# Patient Record
Sex: MALE | Race: WHITE | NOT HISPANIC OR LATINO | Employment: FULL TIME | URBAN - METROPOLITAN AREA
[De-identification: names, ages, dates, MRNs, and addresses within clinical notes are randomized per-mention and may not be internally consistent; named-entity substitution may affect disease eponyms.]

---

## 2022-04-19 ENCOUNTER — NON-PROVIDER VISIT (OUTPATIENT)
Dept: URGENT CARE | Facility: PHYSICIAN GROUP | Age: 22
End: 2022-04-19

## 2022-04-19 DIAGNOSIS — Z02.1 PRE-EMPLOYMENT DRUG SCREENING: Primary | ICD-10-CM

## 2022-04-19 LAB
AMP AMPHETAMINE: NORMAL
BAR BARBITURATES: NORMAL
BZO BENZODIAZEPINES: NORMAL
COC COCAINE: NORMAL
INT CON NEG: NORMAL
INT CON POS: NORMAL
MDMA ECSTASY: NORMAL
MET METHAMPHETAMINES: NORMAL
MTD METHADONE: NORMAL
OPI OPIATES: NORMAL
OXY OXYCODONE: NORMAL
PCP PHENCYCLIDINE: NORMAL
POC URINE DRUG SCREEN OCDRS: NORMAL
THC: NORMAL

## 2022-04-19 PROCEDURE — 80305 DRUG TEST PRSMV DIR OPT OBS: CPT | Performed by: PHYSICIAN ASSISTANT

## 2022-04-28 ENCOUNTER — NON-PROVIDER VISIT (OUTPATIENT)
Dept: OCCUPATIONAL MEDICINE | Facility: CLINIC | Age: 22
End: 2022-04-28

## 2022-04-28 DIAGNOSIS — Z02.89 VISIT FOR OCCUPATIONAL HEALTH EXAMINATION: ICD-10-CM

## 2022-04-28 PROCEDURE — 8911 PR MRO FEE: Performed by: PREVENTIVE MEDICINE

## 2023-04-26 ENCOUNTER — OFFICE VISIT (OUTPATIENT)
Dept: URGENT CARE | Facility: PHYSICIAN GROUP | Age: 23
End: 2023-04-26
Payer: COMMERCIAL

## 2023-04-26 VITALS
HEART RATE: 87 BPM | WEIGHT: 160 LBS | OXYGEN SATURATION: 97 % | RESPIRATION RATE: 20 BRPM | TEMPERATURE: 98.2 F | HEIGHT: 74 IN | SYSTOLIC BLOOD PRESSURE: 100 MMHG | DIASTOLIC BLOOD PRESSURE: 66 MMHG | BODY MASS INDEX: 20.53 KG/M2

## 2023-04-26 DIAGNOSIS — R11.2 NAUSEA AND VOMITING, UNSPECIFIED VOMITING TYPE: ICD-10-CM

## 2023-04-26 DIAGNOSIS — K52.9 GASTROENTERITIS: ICD-10-CM

## 2023-04-26 PROCEDURE — 99213 OFFICE O/P EST LOW 20 MIN: CPT | Performed by: PHYSICIAN ASSISTANT

## 2023-04-26 RX ORDER — ALBUTEROL SULFATE 90 UG/1
2 AEROSOL, METERED RESPIRATORY (INHALATION)
COMMUNITY

## 2023-04-26 RX ORDER — ONDANSETRON 4 MG/1
4 TABLET, ORALLY DISINTEGRATING ORAL EVERY 6 HOURS PRN
Qty: 15 TABLET | Refills: 0 | Status: SHIPPED | OUTPATIENT
Start: 2023-04-26

## 2023-04-26 ASSESSMENT — ENCOUNTER SYMPTOMS
FATIGUE: 1
VOMITING: 1
SORE THROAT: 0
COUGH: 0
ABDOMINAL PAIN: 0
NAUSEA: 1
NUMBER OF EPISODES OF EMESIS TODAY: 1
FEVER: 0

## 2023-04-26 NOTE — PROGRESS NOTES
Subjective:   Mckay Sanders is a 22 y.o. male who presents for Emesis (Nausea,fever,diarrhea,x3 days/Pt req note for work.)         Patient presents with onset of emesis Mon AM, nonbloody, nonbilious.   Has persisted over the last 2 days however slowly improving.  Assoc. Nausea.  10 episodes of vomiting total.  He denies focal abdominal pain, generalized discomfort from retching.  Assoc. Diarrhea; none today.  No bloody stools.  No known fevers.  Vomiting improving.  Drinking water.  Still voiding.  Burger from [employer] the night before, no obvious suspect food intake.  No recent abx use or travel.  H/o appendectomy.      Emesis  Associated symptoms include fatigue, nausea and vomiting. Pertinent negatives include no abdominal pain, coughing, fever or sore throat. The symptoms are aggravated by eating.       Review of Systems   Constitutional:  Positive for fatigue. Negative for fever.   HENT:  Negative for sore throat.    Respiratory:  Negative for cough.    Gastrointestinal:  Positive for nausea and vomiting. Negative for abdominal pain.     Medications:  This patient does not have an active medication from one of the medication groupers.    Allergies:             Penicillins    Surgical History:       No past surgical history on file.    Past Social Hx:  Mckay Sanders  reports that he has been smoking. He has never used smokeless tobacco. He reports that he does not drink alcohol and does not use drugs.     Past Family Hx:   Mckay Sanders family history includes Alcohol/Drug in his maternal grandfather; Diabetes in his father, paternal grandfather, and paternal grandmother; Heart Disease in his father and paternal grandfather; Hyperlipidemia in his paternal grandmother; Hypertension in his father and paternal grandmother; Other in his paternal grandfather; Psychiatric Illness in his maternal grandmother.       Problem list, medications, and allergies reviewed by myself today in Epic.     Objective:     /66 (BP  "Location: Right arm, Patient Position: Sitting, BP Cuff Size: Adult)   Pulse 87   Temp 36.8 °C (98.2 °F) (Temporal)   Resp 20   Ht 1.88 m (6' 2\")   Wt 72.6 kg (160 lb)   SpO2 97%   BMI 20.54 kg/m²     Physical Exam  Vitals and nursing note reviewed.   Constitutional:       General: He is not in acute distress.     Appearance: Normal appearance. He is not ill-appearing, toxic-appearing or diaphoretic.   HENT:      Nose: Nose normal.      Mouth/Throat:      Mouth: Mucous membranes are moist.      Pharynx: No oropharyngeal exudate or posterior oropharyngeal erythema.   Eyes:      Extraocular Movements: Extraocular movements intact.      Pupils: Pupils are equal, round, and reactive to light.   Cardiovascular:      Rate and Rhythm: Normal rate and regular rhythm.      Pulses: Normal pulses.      Heart sounds: Normal heart sounds.   Pulmonary:      Effort: Pulmonary effort is normal. No tachypnea, accessory muscle usage, prolonged expiration, respiratory distress or retractions.      Breath sounds: Normal breath sounds and air entry. No stridor or decreased air movement. No decreased breath sounds, wheezing, rhonchi or rales.      Comments: Lungs cta b/l  Abdominal:      General: Abdomen is flat. Bowel sounds are normal. There is no distension. There are no signs of injury.      Palpations: Abdomen is soft. There is no splenomegaly, mass or pulsatile mass.      Tenderness: There is generalized abdominal tenderness. There is no right CVA tenderness, left CVA tenderness, guarding or rebound. Negative signs include Swenson's sign, Rovsing's sign and McBurney's sign.      Hernia: No hernia is present.      Comments: Generalized abdominal pain, nonfocal.  No guarding or rebound.  Bowel sounds present in all quadrants.  Negative McBurney's.  Negative Swenson's.   Musculoskeletal:      Cervical back: No rigidity.   Lymphadenopathy:      Cervical: No cervical adenopathy.   Neurological:      Mental Status: He is alert. " "      Assessment/Plan:     Diagnosis and Associated Orders:     1. Nausea and vomiting, unspecified vomiting type  - ondansetron (ZOFRAN ODT) 4 MG TABLET DISPERSIBLE; Take 1 Tablet by mouth every 6 hours as needed for Nausea/Vomiting for up to 15 doses.  Dispense: 15 Tablet; Refill: 0    2. Gastroenteritis    Other orders  - albuterol 108 (90 Base) MCG/ACT Aero Soln inhalation aerosol; Inhale 2 Puffs.        Comments/MDM:    Pt has been vomiting for 3 days. Abdomen is soft, non-distended and no obvious signs of pain on palpation. Decreased appetite. Denies bloody or bilious emesis. Denies hematochezia or melena. Denies focal abdominal pain.  No red flags on abdominal exam.  Does not appear clinically dehydrated.  Vital signs stable and reassuring.    Gastroenteritis   Gastroenteritis is an illness of the intestines. It is sometimes called \"stomach flu\". It causes nausea, vomiting, stomach cramps, watery poop (diarrhea) and a slight fever. It is usually caused by a virus. This illness often clears up in 4-5 days. However, it can be serious when people who lose too much fluid from throwing up (vomiting) and/or diarrhea. When too much fluid is lost and has not been replaced, it is called dehydration.   HOME CARE   Wash your hands a lot.   Rest.   Drink fluids slowly. Drinking too much or too fast can cause you to throw up.   Drink \"oral rehydration fluids\" (ORS) as directed. They can be bought in a grocery store or pharmacy. Ask your doctor or pharmacist how to take the ORS if you do not understand.   Do not eat too much at once.   Avoid tobacco, alcohol and drugs that upset your stomach.   BRAT diet start eating bananas, rice, apples and dry toast   GET HELP RIGHT AWAY IF:   You become weak, dizzy, or faint.   You cannot keep fluids down.   You have a dry mouth, no tears and pee less. These are signs of dehydration.   Belly (abdominal) pain starts, feels worse, or stays in one place.   You or your child has a fever " above 102º F (38.9º C) for more than 1 day.   Diarrhea has blood or mucous in it.   You become confused.   After 5-7 days, you are still throwing up and/or having diarrhea.   MAKE SURE YOU:   Understand these instructions.   Will watch your condition.   Will get help right away if you are not doing well or get worse.      I personally reviewed prior external notes and test results pertinent to today's visit.  Red flags discussed as well as indications to present to the Emergency Department.  Supportive care, natural history, differential diagnoses, and indications for immediate follow-up discussed.  Patient expresses understanding and agrees to plan.  Patient denies any other questions or concerns.    Follow-up with the primary care physician for recheck, reevaluation, and consideration of further management.      Please note that this dictation was created using voice recognition software. I have made a reasonable attempt to correct obvious errors, but I expect that there are errors of grammar and possibly content that I did not discover before finalizing the note.    This note was electronically signed by Xi Tejeda PA-C

## 2023-04-26 NOTE — LETTER
April 26, 2023    To Whom It May Concern:         This is confirmation that Mckay Sanders attended his scheduled appointment with Xi Tejeda P.A.-C. on 4/26/23.  Please excuse patient from work 4/24-4/27.         If you have any questions please do not hesitate to call me at the phone number listed below.    Sincerely,          Xi Tejeda P.A.-C.  [phone]

## 2023-08-11 ENCOUNTER — OFFICE VISIT (OUTPATIENT)
Dept: URGENT CARE | Facility: PHYSICIAN GROUP | Age: 23
End: 2023-08-11
Payer: COMMERCIAL

## 2023-08-11 VITALS
SYSTOLIC BLOOD PRESSURE: 116 MMHG | WEIGHT: 160 LBS | TEMPERATURE: 97.9 F | HEIGHT: 74 IN | OXYGEN SATURATION: 97 % | RESPIRATION RATE: 16 BRPM | BODY MASS INDEX: 20.53 KG/M2 | DIASTOLIC BLOOD PRESSURE: 84 MMHG | HEART RATE: 69 BPM

## 2023-08-11 DIAGNOSIS — J02.9 SORE THROAT: ICD-10-CM

## 2023-08-11 DIAGNOSIS — B08.5 HERPANGINA: ICD-10-CM

## 2023-08-11 DIAGNOSIS — B00.1 COLD SORE: ICD-10-CM

## 2023-08-11 LAB — S PYO DNA SPEC NAA+PROBE: NOT DETECTED

## 2023-08-11 PROCEDURE — 3079F DIAST BP 80-89 MM HG: CPT | Performed by: NURSE PRACTITIONER

## 2023-08-11 PROCEDURE — 99213 OFFICE O/P EST LOW 20 MIN: CPT | Performed by: NURSE PRACTITIONER

## 2023-08-11 PROCEDURE — 87651 STREP A DNA AMP PROBE: CPT | Performed by: NURSE PRACTITIONER

## 2023-08-11 PROCEDURE — 3074F SYST BP LT 130 MM HG: CPT | Performed by: NURSE PRACTITIONER

## 2023-08-11 RX ORDER — LIDOCAINE HYDROCHLORIDE 20 MG/ML
SOLUTION OROPHARYNGEAL
Qty: 100 ML | Refills: 0 | Status: SHIPPED | OUTPATIENT
Start: 2023-08-11

## 2023-08-11 RX ORDER — VALACYCLOVIR HYDROCHLORIDE 1 G/1
1000 TABLET, FILM COATED ORAL 2 TIMES DAILY
Qty: 14 TABLET | Refills: 0 | Status: SHIPPED | OUTPATIENT
Start: 2023-08-11 | End: 2023-08-18

## 2023-08-11 ASSESSMENT — ENCOUNTER SYMPTOMS
CHILLS: 0
NEUROLOGICAL NEGATIVE: 1
CONSTITUTIONAL NEGATIVE: 1
TROUBLE SWALLOWING: 1
EYES NEGATIVE: 1
CARDIOVASCULAR NEGATIVE: 1
FEVER: 0
SWOLLEN GLANDS: 1
RESPIRATORY NEGATIVE: 1
SORE THROAT: 1
MUSCULOSKELETAL NEGATIVE: 1
GASTROINTESTINAL NEGATIVE: 1

## 2023-08-11 NOTE — LETTER
August 11, 2023       Patient: Mckay Sanders   YOB: 2000   Date of Visit: 8/11/2023         To Whom It May Concern:    In my medical opinion, I recommend that Mckay Sanders be excused from work 8/10/2023 and 8/11/2023 due to illness.     If you have any questions or concerns, please don't hesitate to call [phone]          Sincerely,          MIRIAM Nuñez.P.R.N.  Electronically Signed

## 2023-08-11 NOTE — PROGRESS NOTES
"Subjective:   Mckay Sanders is a 23 y.o. male who presents for Allergic Reaction (Wednesday tongue swelled, blisters in mouth, throat is sore, not getting any better )      Pharyngitis   This is a new problem. Episode onset: 2 days. The problem has been gradually worsening. The pain is worse on the right side. There has been no fever. The pain is at a severity of 6/10. The pain is moderate. Associated symptoms include swollen glands and trouble swallowing. He has tried NSAIDs, cool liquids and gargles for the symptoms. The treatment provided mild relief.       Review of Systems   Constitutional: Negative.  Negative for chills and fever.   HENT:  Positive for sore throat and trouble swallowing.         Sores in mouth, sore on lip   Eyes: Negative.    Respiratory: Negative.     Cardiovascular: Negative.    Gastrointestinal: Negative.    Genitourinary: Negative.    Musculoskeletal: Negative.    Skin: Negative.    Neurological: Negative.        Medications, Allergies, and current problem list reviewed today in Epic.     Objective:     /84 (BP Location: Right arm, Patient Position: Sitting, BP Cuff Size: Adult)   Pulse 69   Temp 36.6 °C (97.9 °F) (Temporal)   Resp 16   Ht 1.88 m (6' 2\")   Wt 72.6 kg (160 lb)   SpO2 97%     Physical Exam  Vitals reviewed.   Constitutional:       General: He is not in acute distress.     Appearance: Normal appearance. He is not ill-appearing.   HENT:      Head: Normocephalic and atraumatic.      Right Ear: Tympanic membrane, ear canal and external ear normal.      Left Ear: Tympanic membrane, ear canal and external ear normal.      Nose: Nose normal.      Mouth/Throat:      Lips: Lesions present.      Mouth: Mucous membranes are moist. Oral lesions present. No angioedema.      Tongue: Lesions present.      Pharynx: Oropharynx is clear. Uvula midline. Posterior oropharyngeal erythema present. No oropharyngeal exudate.      Tonsils: No tonsillar exudate. 1+ on the right. 1+ on the " left.        Comments: Oral ulcers to right tongue, right buccal mucosa and oropharynx.  Cold sore noted to left bottom lip.   Eyes:      Extraocular Movements: Extraocular movements intact.      Conjunctiva/sclera: Conjunctivae normal.      Pupils: Pupils are equal, round, and reactive to light.   Cardiovascular:      Rate and Rhythm: Normal rate and regular rhythm.      Pulses: Normal pulses.      Heart sounds: Normal heart sounds.   Pulmonary:      Effort: Pulmonary effort is normal.      Breath sounds: Normal breath sounds.   Abdominal:      General: Abdomen is flat. Bowel sounds are normal.      Palpations: Abdomen is soft.   Musculoskeletal:         General: Normal range of motion.      Cervical back: Normal range of motion and neck supple.   Skin:     General: Skin is warm and dry.      Capillary Refill: Capillary refill takes less than 2 seconds.   Neurological:      General: No focal deficit present.      Mental Status: He is alert and oriented to person, place, and time.   Psychiatric:         Mood and Affect: Mood normal.         Behavior: Behavior normal.         Lab Results/POC Test Results   Results for orders placed or performed in visit on 08/11/23   POCT GROUP A STREP, PCR   Result Value Ref Range    POC Group A Strep, PCR Not Detected Not Detected, Invalid           Assessment/Plan:     Diagnosis and associated orders:     1. Herpangina        2. Sore throat  POCT GROUP A STREP, PCR    lidocaine (XYLOCAINE) 2 % Solution      3. Cold sore  valacyclovir (VALTREX) 1 GM Tab         Comments/MDM:     Patient will be treated with viscous lidocaine for his oral ulcerations, and valacyclovir for his cold sore. He will follow up with PCP in 7 to 10 days, and present back to UC if worsening symptoms.          Differential diagnosis, natural history, supportive care, and indications for immediate follow-up discussed.    Advised the patient to follow-up with the primary care physician for recheck,  reevaluation, and consideration of further management.    Please note that this dictation was created using voice recognition software. I have made a reasonable attempt to correct obvious errors, but I expect that there are errors of grammar and possibly content that I did not discover before finalizing the note.    This note was electronically signed by JOHN Palumbo

## 2024-09-25 ENCOUNTER — OFFICE VISIT (OUTPATIENT)
Dept: URGENT CARE | Facility: PHYSICIAN GROUP | Age: 24
End: 2024-09-25
Payer: COMMERCIAL

## 2024-09-25 VITALS
HEART RATE: 87 BPM | BODY MASS INDEX: 23.87 KG/M2 | WEIGHT: 186 LBS | TEMPERATURE: 98.3 F | RESPIRATION RATE: 16 BRPM | OXYGEN SATURATION: 96 % | DIASTOLIC BLOOD PRESSURE: 72 MMHG | SYSTOLIC BLOOD PRESSURE: 120 MMHG | HEIGHT: 74 IN

## 2024-09-25 DIAGNOSIS — J06.9 VIRAL URI WITH COUGH: ICD-10-CM

## 2024-09-25 DIAGNOSIS — Z76.0 MEDICATION REFILL: ICD-10-CM

## 2024-09-25 DIAGNOSIS — J02.9 PHARYNGITIS, UNSPECIFIED ETIOLOGY: ICD-10-CM

## 2024-09-25 LAB
FLUAV RNA SPEC QL NAA+PROBE: NEGATIVE
FLUBV RNA SPEC QL NAA+PROBE: NEGATIVE
RSV RNA SPEC QL NAA+PROBE: NEGATIVE
S PYO DNA SPEC NAA+PROBE: NOT DETECTED
SARS-COV-2 RNA RESP QL NAA+PROBE: NEGATIVE

## 2024-09-25 PROCEDURE — 3074F SYST BP LT 130 MM HG: CPT

## 2024-09-25 PROCEDURE — 99213 OFFICE O/P EST LOW 20 MIN: CPT

## 2024-09-25 PROCEDURE — 0241U POCT CEPHEID COV-2, FLU A/B, RSV - PCR: CPT

## 2024-09-25 PROCEDURE — 87651 STREP A DNA AMP PROBE: CPT

## 2024-09-25 PROCEDURE — 3078F DIAST BP <80 MM HG: CPT

## 2024-09-25 RX ORDER — BENZONATATE 100 MG/1
100 CAPSULE ORAL 3 TIMES DAILY PRN
Qty: 60 CAPSULE | Refills: 0 | Status: SHIPPED | OUTPATIENT
Start: 2024-09-25

## 2024-09-25 RX ORDER — ALBUTEROL SULFATE 90 UG/1
2 INHALANT RESPIRATORY (INHALATION) EVERY 6 HOURS PRN
Qty: 8.5 G | Refills: 0 | Status: SHIPPED | OUTPATIENT
Start: 2024-09-25

## 2024-09-25 ASSESSMENT — ENCOUNTER SYMPTOMS
MYALGIAS: 1
FEVER: 1
COUGH: 1
SORE THROAT: 1

## 2024-09-25 NOTE — LETTER
September 25, 2024    To Whom It May Concern:         This is confirmation that Mckay Sanders attended his scheduled appointment with Lupe Robert P.A.-C. on 9/25/24. Please excuse his absences from work 9/23/24 through 9/25/24.          If you have any questions please do not hesitate to call me at the phone number listed below.    Sincerely,          Lupe Robert P.A.-C.  [phone]

## 2024-09-25 NOTE — PROGRESS NOTES
Subjective:     CHIEF COMPLAINT  Chief Complaint   Patient presents with    Cough     Coughing and cold sweats, hard time sleeping. 3 days.        HPI  Mckay Sanders is a very pleasant 24 y.o. male who presents with a cough, body aches, sore throat, fatigue, and sweats for the past 3 days.  His coworker was sick with similar symptoms.  He has felt feverish but has not measured his temperature.  He has been managing his symptoms with naproxen as needed.  He reports a history of asthma and has ran out of his albuterol inhaler.    REVIEW OF SYSTEMS  Review of Systems   Constitutional:  Positive for fever (Tactile) and malaise/fatigue.   HENT:  Positive for congestion, ear pain (Pressure) and sore throat.    Respiratory:  Positive for cough.    Musculoskeletal:  Positive for myalgias.       PAST MEDICAL HISTORY  There are no problems to display for this patient.      SURGICAL HISTORY  patient denies any surgical history    ALLERGIES  Allergies   Allergen Reactions    Penicillins Unspecified     Family Hx       CURRENT MEDICATIONS  Home Medications       Reviewed by Lupe Robert P.A.-C. (Physician Assistant) on 09/25/24 at 1434  Med List Status: <None>     Medication Last Dose Status   albuterol 108 (90 Base) MCG/ACT Aero Soln inhalation aerosol Not Taking Active   lidocaine (XYLOCAINE) 2 % Solution Not Taking Active   ondansetron (ZOFRAN ODT) 4 MG TABLET DISPERSIBLE Not Taking Active                    SOCIAL HISTORY  Social History     Tobacco Use    Smoking status: Every Day    Smokeless tobacco: Never   Substance and Sexual Activity    Alcohol use: No    Drug use: No    Sexual activity: Never       FAMILY HISTORY  Family History   Problem Relation Age of Onset    Diabetes Father     Heart Disease Father         CHF    Hypertension Father     Psychiatric Illness Maternal Grandmother     Alcohol/Drug Maternal Grandfather     Hypertension Paternal Grandmother     Diabetes Paternal Grandmother     Hyperlipidemia  "Paternal Grandmother     Heart Disease Paternal Grandfather     Other Paternal Grandfather         Polio    Diabetes Paternal Grandfather           Objective:     VITAL SIGNS: /72   Pulse 87   Temp 36.8 °C (98.3 °F) (Temporal)   Resp 16   Ht 1.88 m (6' 2\")   Wt 84.4 kg (186 lb)   SpO2 96%   BMI 23.88 kg/m²     PHYSICAL EXAM  Physical Exam  Vitals reviewed.   Constitutional:       General: He is not in acute distress.     Appearance: Normal appearance. He is ill-appearing. He is not toxic-appearing.   HENT:      Head: Normocephalic and atraumatic.      Right Ear: Tympanic membrane, ear canal and external ear normal.      Left Ear: Tympanic membrane, ear canal and external ear normal.      Nose: Nose normal.      Mouth/Throat:      Mouth: Mucous membranes are moist.      Pharynx: Posterior oropharyngeal erythema present. No oropharyngeal exudate.      Comments: Uvula midline.  Tonsils 2+ bilaterally.  Eyes:      Conjunctiva/sclera: Conjunctivae normal.      Pupils: Pupils are equal, round, and reactive to light.   Cardiovascular:      Rate and Rhythm: Normal rate and regular rhythm.      Heart sounds: Normal heart sounds.   Pulmonary:      Effort: Pulmonary effort is normal. No respiratory distress.      Breath sounds: No stridor. Wheezing (Wheezing in bilateral lower lung fields) present. No rhonchi or rales.   Skin:     General: Skin is warm and dry.      Coloration: Skin is not pale.      Findings: No rash.   Neurological:      General: No focal deficit present.      Mental Status: He is alert and oriented to person, place, and time.   Psychiatric:         Mood and Affect: Mood normal.         Assessment/Plan:     1. Viral URI with cough  - POCT CoV-2, Flu A/B, RSV by PCR  - benzonatate (TESSALON) 100 MG Cap; Take 1 Capsule by mouth 3 times a day as needed for Cough.  Dispense: 60 Capsule; Refill: 0    2. Pharyngitis, unspecified etiology  - POCT CEPHEID GROUP A STREP - PCR    3. Medication refill  - " albuterol 108 (90 Base) MCG/ACT Aero Soln inhalation aerosol; Inhale 2 Puffs every 6 hours as needed for Shortness of Breath.  Dispense: 8.5 g; Refill: 0  -Warm salt water gargles as needed for sore throat  -Tylenol/ibuprofen OTC as needed for pain  -Return to clinic if symptoms worsen or fail to resolve      MDM/Comments:  Patient has stable vital signs and is non-toxic appearing.  Strep and viral testing for COVID, influenza, and RSV performed in office with negative results.  Patient provided benzonatate for cough relief.  Patient provided a refill of his albuterol inhaler to use as needed.  Discussed supportive care measures with warm salt water gargles, rest, tylenol/ibuprofen OTC as needed for pain, and maintaining adequate hydration. Patient demonstrated understanding of treatment plan and agreed to return to the clinic if symptoms worsen or fail to resolve.     Differential diagnosis, natural history, supportive care, and indications for immediate follow-up discussed. All questions answered. Patient agrees with the plan of care.    Follow-up as needed if symptoms worsen or fail to improve to PCP, Urgent care or Emergency Room.    I have personally reviewed prior external notes and test results pertinent to today's visit.  I have independently reviewed and interpreted all diagnostics ordered during this urgent care acute visit.   Discussed management options (risks,benefits, and alternatives to treatment). Pt expresses understanding and the treatment plan was agreed upon. Questions were encouraged and answered to pt's satisfaction.    Please note that this dictation was created using voice recognition software. I have made a reasonable attempt to correct obvious errors, but I expect that there are errors of grammar and possibly content that I did not discover before finalizing the note.

## 2024-10-03 ENCOUNTER — OFFICE VISIT (OUTPATIENT)
Dept: URGENT CARE | Facility: PHYSICIAN GROUP | Age: 24
End: 2024-10-03
Payer: COMMERCIAL

## 2024-10-03 VITALS
SYSTOLIC BLOOD PRESSURE: 120 MMHG | WEIGHT: 187 LBS | OXYGEN SATURATION: 96 % | HEART RATE: 86 BPM | DIASTOLIC BLOOD PRESSURE: 84 MMHG | RESPIRATION RATE: 16 BRPM | TEMPERATURE: 99.1 F | BODY MASS INDEX: 24.01 KG/M2

## 2024-10-03 DIAGNOSIS — J40 BRONCHITIS: ICD-10-CM

## 2024-10-03 DIAGNOSIS — B96.89 ACUTE BACTERIAL SINUSITIS: ICD-10-CM

## 2024-10-03 DIAGNOSIS — J01.90 ACUTE BACTERIAL SINUSITIS: ICD-10-CM

## 2024-10-03 PROCEDURE — 3079F DIAST BP 80-89 MM HG: CPT | Performed by: NURSE PRACTITIONER

## 2024-10-03 PROCEDURE — 3074F SYST BP LT 130 MM HG: CPT | Performed by: NURSE PRACTITIONER

## 2024-10-03 PROCEDURE — 99213 OFFICE O/P EST LOW 20 MIN: CPT | Performed by: NURSE PRACTITIONER

## 2024-10-03 RX ORDER — DOXYCYCLINE HYCLATE 100 MG
100 TABLET ORAL 2 TIMES DAILY
Qty: 14 TABLET | Refills: 0 | Status: SHIPPED | OUTPATIENT
Start: 2024-10-03 | End: 2024-10-10

## 2024-10-03 RX ORDER — LEVOCETIRIZINE DIHYDROCHLORIDE 5 MG/1
5 TABLET, FILM COATED ORAL EVERY EVENING
COMMUNITY

## 2024-12-27 ENCOUNTER — OFFICE VISIT (OUTPATIENT)
Dept: URGENT CARE | Facility: PHYSICIAN GROUP | Age: 24
End: 2024-12-27
Payer: COMMERCIAL

## 2024-12-27 VITALS
TEMPERATURE: 99 F | SYSTOLIC BLOOD PRESSURE: 110 MMHG | HEART RATE: 70 BPM | OXYGEN SATURATION: 100 % | BODY MASS INDEX: 24.63 KG/M2 | WEIGHT: 191.8 LBS | RESPIRATION RATE: 16 BRPM | DIASTOLIC BLOOD PRESSURE: 70 MMHG

## 2024-12-27 DIAGNOSIS — J01.00 ACUTE NON-RECURRENT MAXILLARY SINUSITIS: ICD-10-CM

## 2024-12-27 PROCEDURE — 3074F SYST BP LT 130 MM HG: CPT | Performed by: NURSE PRACTITIONER

## 2024-12-27 PROCEDURE — 99213 OFFICE O/P EST LOW 20 MIN: CPT | Performed by: NURSE PRACTITIONER

## 2024-12-27 PROCEDURE — 3078F DIAST BP <80 MM HG: CPT | Performed by: NURSE PRACTITIONER

## 2024-12-27 RX ORDER — FLUTICASONE PROPIONATE 50 MCG
1 SPRAY, SUSPENSION (ML) NASAL DAILY
Qty: 16 G | Refills: 0 | Status: SHIPPED | OUTPATIENT
Start: 2024-12-27

## 2024-12-27 RX ORDER — PREDNISONE 20 MG/1
20 TABLET ORAL DAILY
Qty: 7 TABLET | Refills: 0 | Status: SHIPPED | OUTPATIENT
Start: 2024-12-27 | End: 2025-01-03

## 2024-12-27 RX ORDER — ALBUTEROL SULFATE 90 UG/1
2 INHALANT RESPIRATORY (INHALATION) EVERY 4 HOURS PRN
Qty: 1 EACH | Refills: 0 | Status: SHIPPED | OUTPATIENT
Start: 2024-12-27 | End: 2025-01-10

## 2024-12-27 ASSESSMENT — ENCOUNTER SYMPTOMS
SORE THROAT: 1
SINUS PAIN: 1
VOMITING: 0
HEADACHES: 1
NAUSEA: 0
FATIGUE: 1
DIARRHEA: 0
ABDOMINAL PAIN: 0

## 2024-12-27 NOTE — LETTER
December 27, 2024    To Whom It May Concern:         This is confirmation that Mckay Sanders attended his scheduled appointment with JOHN Malik on 12/27/24. Please excuse his absence due to an acute illness starting 12/23 and 12/24/2024. He may return to work on 12/29/2024.          If you have any questions please do not hesitate to call me at the phone number listed below.    Sincerely,          LAUREN Malik.  [phone]

## 2024-12-27 NOTE — PROGRESS NOTES
Subjective:     Mckay Sanders is a 24 y.o. male who presents for Sinus Pain (Cough, sinus pressure, ear pain, fatigue)      Sinus Pain  This is a new problem. The current episode started in the past 7 days (Mckay is a pleasant 24 year old male who presents to  today with complaints of URI symptoms times 3 days. His roomate and Dad are ill with similar symptoms). The problem has been gradually improving. Associated symptoms include congestion, fatigue, headaches and a sore throat. Pertinent negatives include no abdominal pain, nausea or vomiting.   He does have a history of asthma.       Review of Systems   Constitutional:  Positive for fatigue and malaise/fatigue.   HENT:  Positive for congestion, sinus pain and sore throat.    Gastrointestinal:  Negative for abdominal pain, diarrhea, nausea and vomiting.   Neurological:  Positive for headaches.       PMH: History reviewed. No pertinent past medical history.  ALLERGIES:   Allergies   Allergen Reactions    Penicillins Unspecified     Family Hx     SURGHX: History reviewed. No pertinent surgical history.  SOCHX:   Social History     Socioeconomic History    Marital status: Single   Tobacco Use    Smoking status: Every Day    Smokeless tobacco: Never   Substance and Sexual Activity    Alcohol use: No    Drug use: No    Sexual activity: Never     FH:   Family History   Problem Relation Age of Onset    Diabetes Father     Heart Disease Father         CHF    Hypertension Father     Psychiatric Illness Maternal Grandmother     Alcohol/Drug Maternal Grandfather     Hypertension Paternal Grandmother     Diabetes Paternal Grandmother     Hyperlipidemia Paternal Grandmother     Heart Disease Paternal Grandfather     Other Paternal Grandfather         Polio    Diabetes Paternal Grandfather          Objective:   /70   Pulse 70   Temp 37.2 °C (99 °F) (Temporal)   Resp 16   Wt 87 kg (191 lb 12.8 oz)   SpO2 100%   BMI 24.63 kg/m²     Physical Exam  Vitals and nursing  note reviewed.   Constitutional:       General: He is not in acute distress.     Appearance: Normal appearance. He is normal weight. He is not ill-appearing.   HENT:      Head: Normocephalic and atraumatic.      Right Ear: External ear normal.      Left Ear: External ear normal.      Nose: Congestion and rhinorrhea present.      Right Sinus: Maxillary sinus tenderness present.      Left Sinus: Maxillary sinus tenderness present.      Mouth/Throat:      Mouth: Mucous membranes are moist.   Eyes:      Extraocular Movements: Extraocular movements intact.      Pupils: Pupils are equal, round, and reactive to light.   Cardiovascular:      Rate and Rhythm: Normal rate and regular rhythm.      Pulses: Normal pulses.      Heart sounds: Normal heart sounds.   Pulmonary:      Effort: Pulmonary effort is normal. No respiratory distress.      Breath sounds: Normal breath sounds. No stridor. No wheezing, rhonchi or rales.   Chest:      Chest wall: No tenderness.   Abdominal:      General: Abdomen is flat. Bowel sounds are normal.      Palpations: Abdomen is soft.      Tenderness: There is no abdominal tenderness. There is no right CVA tenderness or left CVA tenderness.   Musculoskeletal:         General: Normal range of motion.      Cervical back: Normal range of motion and neck supple. Tenderness present.   Lymphadenopathy:      Cervical: Cervical adenopathy present.   Skin:     General: Skin is warm and dry.      Capillary Refill: Capillary refill takes less than 2 seconds.   Neurological:      General: No focal deficit present.      Mental Status: He is alert and oriented to person, place, and time. Mental status is at baseline.   Psychiatric:         Mood and Affect: Mood normal.         Behavior: Behavior normal.         Thought Content: Thought content normal.         Judgment: Judgment normal.         Assessment/Plan:   Assessment    1. Acute non-recurrent maxillary sinusitis  predniSONE (DELTASONE) 20 MG Tab     fluticasone (FLONASE) 50 MCG/ACT nasal spray    albuterol 108 (90 Base) MCG/ACT Aero Soln inhalation aerosol        Prescriptions called into pharmacy. AVS printed and reviewed with pt. Red flags identified of when to seek care back in UC or ER including SOB, increased fever and worsening symptoms. Symptomatic treatment such as OTC Ibuprofen/ Acetaminophen, increased fluids, and humidifier encouraged Pt in agreement with care plan today.

## 2025-02-06 ENCOUNTER — OFFICE VISIT (OUTPATIENT)
Dept: URGENT CARE | Facility: PHYSICIAN GROUP | Age: 25
End: 2025-02-06
Payer: COMMERCIAL

## 2025-02-06 VITALS
BODY MASS INDEX: 24.41 KG/M2 | DIASTOLIC BLOOD PRESSURE: 70 MMHG | RESPIRATION RATE: 16 BRPM | SYSTOLIC BLOOD PRESSURE: 110 MMHG | TEMPERATURE: 99.6 F | WEIGHT: 190.1 LBS | OXYGEN SATURATION: 98 % | HEART RATE: 96 BPM

## 2025-02-06 DIAGNOSIS — R09.81 SINUS CONGESTION: ICD-10-CM

## 2025-02-06 DIAGNOSIS — B37.2 YEAST INFECTION OF THE SKIN: ICD-10-CM

## 2025-02-06 PROCEDURE — 99214 OFFICE O/P EST MOD 30 MIN: CPT

## 2025-02-06 PROCEDURE — 3078F DIAST BP <80 MM HG: CPT

## 2025-02-06 PROCEDURE — 3074F SYST BP LT 130 MM HG: CPT

## 2025-02-06 RX ORDER — CLOTRIMAZOLE 1 %
1 CREAM (GRAM) TOPICAL 2 TIMES DAILY
Qty: 12 G | Refills: 0 | Status: SHIPPED | OUTPATIENT
Start: 2025-02-06

## 2025-02-06 ASSESSMENT — ENCOUNTER SYMPTOMS
BLURRED VISION: 0
COUGH: 0
ABDOMINAL PAIN: 0
DIAPHORESIS: 0
FEVER: 0
CHILLS: 0
SINUS PAIN: 1
VOMITING: 0
EYE DISCHARGE: 0
WEAKNESS: 0
DIZZINESS: 0
DIARRHEA: 0
PSYCHIATRIC NEGATIVE: 1
NAUSEA: 0
SORE THROAT: 0
BLOOD IN STOOL: 0
SHORTNESS OF BREATH: 0
MYALGIAS: 0
HEADACHES: 0
SPUTUM PRODUCTION: 0
WHEEZING: 0

## 2025-02-06 NOTE — PROGRESS NOTES
Subjective:   Mckay Sanders is a 24 y.o. male who presents for Ear Pain (ear pain, sinus pain. ) and Other (Skin irritation around penis- red, flakey. Last 3-4 days. Started in one spot and now spreading. )      HPI  Patient complains of bilateral ear pain and sinus congestion for 3 days.   Hx of asthma and uses albuterol; last use was this morning.     Negative: muffled voice, drooling, vision changes, recent travel     Patient also states he has noticed a flaky red spots to his penis that are slightly itchy. Recent new sexual partner.     Negative: Penile discharge, urinary complaints      Review of Systems   Constitutional:  Negative for chills, diaphoresis, fever and malaise/fatigue.   HENT:  Positive for ear pain and sinus pain. Negative for congestion and sore throat.    Eyes:  Negative for blurred vision and discharge.   Respiratory:  Negative for cough, sputum production, shortness of breath and wheezing.    Cardiovascular:  Negative for chest pain.   Gastrointestinal:  Negative for abdominal pain, blood in stool, diarrhea, nausea and vomiting.   Genitourinary: Negative.    Musculoskeletal:  Negative for myalgias.   Skin:  Positive for rash.   Neurological:  Negative for dizziness, weakness and headaches.   Endo/Heme/Allergies: Negative.    Psychiatric/Behavioral: Negative.     All other systems reviewed and are negative.      Medical History:  History reviewed. No pertinent past medical history.    Allergies:  Allergies   Allergen Reactions    Penicillins Unspecified     Family Hx       Social history, surgical history, medications, and current problem list reviewed today in Epic.       Objective:     /70   Pulse 96   Temp 37.6 °C (99.6 °F) (Temporal)   Resp 16   Wt 86.2 kg (190 lb 1.6 oz)   SpO2 98%     Physical Exam  Vitals reviewed.   Constitutional:       General: He is not in acute distress.     Appearance: Normal appearance. He is not ill-appearing, toxic-appearing or diaphoretic.   HENT:       Head: Normocephalic.      Jaw: There is normal jaw occlusion.      Right Ear: Tympanic membrane, ear canal and external ear normal.      Left Ear: Tympanic membrane, ear canal and external ear normal.      Nose: Congestion and rhinorrhea present.      Right Sinus: No maxillary sinus tenderness or frontal sinus tenderness.      Left Sinus: No maxillary sinus tenderness or frontal sinus tenderness.      Mouth/Throat:      Lips: Pink.      Mouth: Mucous membranes are moist.      Tongue: Tongue does not deviate from midline.      Pharynx: Oropharynx is clear. Uvula midline. No oropharyngeal exudate, posterior oropharyngeal erythema or uvula swelling.   Eyes:      Extraocular Movements: Extraocular movements intact.      Conjunctiva/sclera: Conjunctivae normal.      Pupils: Pupils are equal, round, and reactive to light.   Cardiovascular:      Rate and Rhythm: Normal rate and regular rhythm.      Pulses: Normal pulses.      Heart sounds: Normal heart sounds.   Pulmonary:      Effort: Pulmonary effort is normal.      Breath sounds: Normal breath sounds.   Abdominal:      General: Abdomen is flat.      Palpations: Abdomen is soft.      Tenderness: There is no abdominal tenderness.   Musculoskeletal:         General: Normal range of motion.      Cervical back: Normal range of motion and neck supple.   Lymphadenopathy:      Cervical: No cervical adenopathy.   Skin:     General: Skin is warm.      Capillary Refill: Capillary refill takes less than 2 seconds.      Findings: Rash present. Rash is scaling.          Neurological:      General: No focal deficit present.      Mental Status: He is alert and oriented to person, place, and time.   Psychiatric:         Mood and Affect: Mood normal.         Behavior: Behavior normal.         Assessment/Plan:       Diagnosis and associated orders:     1. Yeast infection of the skin  - clotrimazole (LOTRIMIN) 1 % Cream; Apply 1 Application topically 2 times a day.  Dispense: 12 g;  Refill: 0    2. Sinus congestion     Comments/MDM:   I personally reviewed prior external notes and test results pertinent to today's visit as well as additional imaging and testing completed in clinic today.     Very pleasant 24-year-old afebrile, hemodynamically stable, generally well-appearing male, presenting with complaints of sinus congestion, bilateral ear pain, and skin irritation to penis.  Normal pulmonary exam, no concern for pneumonia or bronchitis.  Normal ENT exam outside of nasal congestion and rhinorrhea, no concern for acute otitis media, strep pharyngitis, or bacterial sinus infection.  Patient encouraged to increase fluids and rest, cool-mist humidifier, saline nasal rinse with distilled water, cough/throat drops, salt water gargles, tylenol or ibuprofen for fever and/or bodyaches.  At this time I do not believe antibiotics would improve patient's symptoms.  Patient encouraged to follow-up with the clinic in 48 hours for re-evaluation as needed.  Patient given strict instructions to follow up with the emergency room if they develop worsening symptoms.  Patient verbalized understanding.  Flaky rash noted to penis.  Patient states it is mildly itchy.  Patient diagnosed with yeast infection of skin, Lotrimin has been sent to the pharmacy patient instructed to use the medication twice a day until cleared.  Patient denies concerns for sexually transmitted diseases.      Patient is clinically stable at today's acute urgent care visit. Vital signs are normal and reassuring.  No acute distress noted. Appropriate for outpatient management at this time. No red flag warnings noted.  Patient given strict instructions to follow up with emergency room if they develop any red flag warnings which were discussed in depth.  They verbalized understanding.      Differential diagnosis, natural history, supportive care, and indications for immediate follow-up discussed. All questions answered. Patient agrees with the  plan of care. Advised the patient to follow-up with the primary care physician for recheck, reevaluation, and consideration of further management or the emergency room for worsening symptoms.      Please note that this dictation was created using voice recognition software. I have made every reasonable attempt to correct obvious errors, but I expect that there are errors of grammar and possibly content that I did not discover before finalizing the note.

## 2025-02-06 NOTE — LETTER
February 6, 2025         Patient: Mckay Sanders   YOB: 2000   Date of Visit: 2/6/2025           To Whom it May Concern:    Mckay Sanders was seen in my clinic on 2/6/2025. He may return to work on 2/9/25, please excuse him from his missed days due to illness.    If you have any questions or concerns, please don't hesitate to call.        Sincerely,           LAUREN Owens.  Electronically Signed

## 2025-02-06 NOTE — PATIENT INSTRUCTIONS
Education:  -A cough can persist for up to 6 weeks.  -Increase fluids.  -Eat whole foods that are high in vitamins and minerals to aid in healing.  -REST! REST! Rest! Let your body rest so it can heal.   -Cool-mist humidifier for nighttime use.   -Practice good hand hygiene.  -You may use either acetaminophen or ibuprofen over-the-counter every 6-8 hours for pain or fever if that is not contraindicated with your body.   -Chloraseptic lozenge, warm tea with honey or throat spray to help with discomfort.   -Zinc 25 mg has been shown to be effective in reducing the duration of cold symptoms.  -Saline Nasal Spray and Flonase may be used for congestion. Nasal saline in the nose helps to help break up nasal secretions and is beneficial for nasal symptoms and throat pain.  -Saline Nasal Rinse with a Neti pot or Mckinley med rinse can be used multiple times a day. Be sure to use distilled water or boil tap water for 10 mins. Add a saline packet. Be sure the water is not too hot (around your body temp of 98 degrees)  -Decongestants are not recommended as they can have a rebound congestion effect along with many side effects (especially if you have high blood pressure).   -Salt water gargles for sore throat several times a day.

## 2025-03-11 ENCOUNTER — OFFICE VISIT (OUTPATIENT)
Dept: URGENT CARE | Facility: PHYSICIAN GROUP | Age: 25
End: 2025-03-11
Payer: COMMERCIAL

## 2025-03-11 VITALS
OXYGEN SATURATION: 98 % | DIASTOLIC BLOOD PRESSURE: 60 MMHG | SYSTOLIC BLOOD PRESSURE: 110 MMHG | RESPIRATION RATE: 16 BRPM | HEART RATE: 91 BPM | TEMPERATURE: 99.4 F | WEIGHT: 188.9 LBS | BODY MASS INDEX: 24.25 KG/M2

## 2025-03-11 DIAGNOSIS — R09.81 NASAL CONGESTION: ICD-10-CM

## 2025-03-11 PROCEDURE — 3074F SYST BP LT 130 MM HG: CPT | Performed by: NURSE PRACTITIONER

## 2025-03-11 PROCEDURE — 3078F DIAST BP <80 MM HG: CPT | Performed by: NURSE PRACTITIONER

## 2025-03-11 PROCEDURE — 99213 OFFICE O/P EST LOW 20 MIN: CPT | Performed by: NURSE PRACTITIONER

## 2025-03-11 NOTE — PROGRESS NOTES
Subjective:   Mckay Sanders is a 24 y.o. male who presents for Nasal Congestion (X3 days Congestion, drowsy, cough, sinus pressure, runny nose )    Patient is a 24-year-old male presenting clinic today reporting 3-day history of nasal congestion, fatigue, sinus pain and pressure, runny nose, and mild cough.  Patient denies any shortness of breath, wheezing, chest pain, or fevers.  He has taken over-the-counter antihistamine/sinus medications which has helped some with symptoms.  Patient does have history of asthma but denies any exacerbation of symptoms.    Medications, Allergies, and current problem list reviewed today in Epic.     Objective:     /60   Pulse 91   Temp 37.4 °C (99.4 °F) (Temporal)   Resp 16   Wt 85.7 kg (188 lb 14.4 oz)   SpO2 98%     Physical Exam  Vitals reviewed.   Constitutional:       General: He is not in acute distress.     Appearance: Normal appearance. He is not ill-appearing or toxic-appearing.   HENT:      Head: Normocephalic.      Right Ear: Tympanic membrane, ear canal and external ear normal.      Left Ear: Tympanic membrane, ear canal and external ear normal.      Nose: Congestion and rhinorrhea present.      Mouth/Throat:      Lips: Pink. No lesions.      Mouth: Mucous membranes are moist.      Pharynx: Oropharynx is clear. Uvula midline. Postnasal drip present. No pharyngeal swelling, oropharyngeal exudate, posterior oropharyngeal erythema or uvula swelling.   Eyes:      Extraocular Movements: Extraocular movements intact.      Conjunctiva/sclera: Conjunctivae normal.      Pupils: Pupils are equal, round, and reactive to light.   Cardiovascular:      Rate and Rhythm: Normal rate and regular rhythm.   Pulmonary:      Effort: Pulmonary effort is normal. No respiratory distress.      Breath sounds: Normal breath sounds. No stridor. No wheezing, rhonchi or rales.   Musculoskeletal:         General: Normal range of motion.      Cervical back: Normal range of motion and neck  supple.   Skin:     General: Skin is warm and dry.   Neurological:      General: No focal deficit present.      Mental Status: He is alert and oriented to person, place, and time.   Psychiatric:         Mood and Affect: Mood normal.         Behavior: Behavior normal.         Assessment/Plan:     Diagnosis and associated orders:     1. Nasal congestion           Comments/MDM:     This acute condition.  Patient is nontoxic-appearing in no acute distress.  Patient does have nasal congestion present in clinic.  No signs of bacterial sinusitis.  Discussed differentials including viral versus allergic in nature.  Recommended over-the-counter Sudafed, Flonase, and daily antihistamine.  Stressed the importance of nasal toileting.    Did discuss and offer viral testing in clinic however patient was concerned about cost.  Did discuss over-the-counter test that are available.  Represent clinic if symptoms are not improving or sooner if they acutely worsen.  Patient was involved with shared decision-making throughout the exam today and verbalizes understanding regards to plan of care, discharge instructions, and follow-up         Differential diagnosis, natural history, supportive care, and indications for immediate follow-up discussed.    Advised the patient to follow-up with the primary care physician for recheck, reevaluation, and consideration of further management.    I personally reviewed prior external notes and test results pertinent to today's visit as well as additional imaging and testing completed in clinic today.     Please note that this dictation was created using voice recognition software. I have made a reasonable attempt to correct obvious errors, but I expect that there are errors of grammar and possibly content that I did not discover before finalizing the note.

## 2025-03-11 NOTE — LETTER
[facility] [place]  [address]  [facility] [id number]     March 11, 2025    Patient: Mckay Sanders   YOB: 2000   Date of Visit: 3/11/2025       To Whom It May Concern:    Mckay Sanders was seen and treated in our department on 3/11/2025.  Will need to be Excuse from work starting 3/10/2025 through 3/12/2025 due to illness.    Sincerely,     LAUREN Sapp.